# Patient Record
Sex: MALE | Race: WHITE
[De-identification: names, ages, dates, MRNs, and addresses within clinical notes are randomized per-mention and may not be internally consistent; named-entity substitution may affect disease eponyms.]

---

## 2019-07-06 ENCOUNTER — HOSPITAL ENCOUNTER (EMERGENCY)
Dept: HOSPITAL 25 - UCCORT | Age: 44
Discharge: HOME | End: 2019-07-06
Payer: COMMERCIAL

## 2019-07-06 VITALS — SYSTOLIC BLOOD PRESSURE: 145 MMHG | DIASTOLIC BLOOD PRESSURE: 102 MMHG

## 2019-07-06 DIAGNOSIS — I10: ICD-10-CM

## 2019-07-06 DIAGNOSIS — J98.01: ICD-10-CM

## 2019-07-06 DIAGNOSIS — J01.90: Primary | ICD-10-CM

## 2019-07-06 PROCEDURE — 99202 OFFICE O/P NEW SF 15 MIN: CPT

## 2019-07-06 PROCEDURE — G0463 HOSPITAL OUTPT CLINIC VISIT: HCPCS

## 2019-07-06 NOTE — UC
Respiratory Complaint HPI





- HPI Summary


HPI Summary: 





45 yo male with about a month hx of severe seasonal allergic rhinitis


now with increased facial pressure and pain x 3-4 days and a cough productive 

of green phlegm





no fever/chills


no cp or sob











- History of Current Complaint


Chief Complaint: UCGeneralIllness


Stated Complaint: SINUS/CHEST CONGESTION


Time Seen by Provider: 07/06/19 07:55


Hx Obtained From: Patient


Onset/Duration: Gradual Onset, Lasting Weeks


Timing: Constant


Severity Initially: Mild


Severity Currently: Moderate


Pain Intensity: 0


Pain Scale Used: 0-10 Numeric


Character: Cough: Productive, Sputum Description: - green


Aggravating Factors: Allergens, Exertion, Deep Breaths


Alleviating Factors: Nothing


Associated Signs And Symptoms: Positive: URI, Nasal Congestion, Sinus Discomfort





- Allergies/Home Medications


Allergies/Adverse Reactions: 


 Allergies











Allergy/AdvReac Type Severity Reaction Status Date / Time


 


contrast dye Allergy  Hives Uncoded 07/06/19 07:52











Home Medications: 


 Home Medications





Hydrochlorothiazide TAB* [Hydrodiuril TAB*] 25 mg PO DAILY 07/06/19 [History 

Confirmed 07/06/19]


Metoprolol Succinate 100 mg PO BID 07/06/19 [History Confirmed 07/06/19]











PMH/Surg Hx/FS Hx/Imm Hx


Previously Healthy: Yes


Cardiovascular History: Hypertension





- Surgical History


Surgical History: None





- Family History


Known Family History: Positive: Hypertension





- Social History


Alcohol Use: Occasionally


Substance Use Type: None


Smoking Status (MU): Never Smoked Tobacco





Review of Systems


All Other Systems Reviewed And Are Negative: Yes


Constitutional: Positive: Fatigue


Skin: Positive: Negative


Eyes: Positive: Negative


ENT: Positive: Nasal Discharge, Sinus Congestion, Sinus Pain/Tenderness


Respiratory: Positive: Cough


Cardiovascular: Positive: Negative


Gastrointestinal: Positive: Negative


Genitourinary: Positive: Negative


Motor: Positive: Negative


Neurovascular: Positive: Negative


Musculoskeletal: Positive: Negative


Neurological: Positive: Negative


Psychological: Positive: Negative





Physical Exam


Triage Information Reviewed: Yes


Appearance: Well-Appearing, No Pain Distress


Vital Signs: 


 Initial Vital Signs











Temp  98.2 F   07/06/19 07:47


 


Pulse  77   07/06/19 07:47


 


Resp  16   07/06/19 07:47


 


BP  145/102   07/06/19 07:47


 


Pulse Ox  97   07/06/19 07:47











Vital Signs Reviewed: Yes


Eyes: Positive: Conjunctiva Clear


ENT: Positive: Hearing grossly normal, Nasal congestion, TMs normal, Sinus 

tenderness, Uvula midline.  Negative: Tonsillar swelling, Tonsillar exudate, 

Muffled voice, Hoarse voice


Dental Exam: Normal


Neck: Positive: Supple, Nontender, No Lymphadenopathy


Respiratory: Positive: Lungs clear, Normal breath sounds, No respiratory 

distress, No accessory muscle use


Cardiovascular: Positive: RRR, No Murmur


Abdomen Description: Positive: Nontender


Bowel Sounds: Positive: Present


Musculoskeletal: Positive: ROM Intact, No Edema


Neurological: Positive: Alert


Psychological Exam: Normal


Skin Exam: Normal





Respiratory Course/Dx





- Differential Dx/Diagnosis


Provider Diagnosis: 


 Acute sinusitis, Acute bronchitis








Discharge





- Sign-Out/Discharge


Documenting (check all that apply): Patient Departure


All imaging exams completed and their final reports reviewed: No Studies





- Discharge Plan


Condition: Stable


Disposition: HOME


Prescriptions: 


Amoxicillin PO (*) [Amoxicillin 875 MG (*)] 875 mg PO BID #14 tab


Fluticasone NASAL SPRAY 50MCG* [Flonase NASAL SPRAY 50MCG*] 2 spray BOTH NARES 

BID #1 btl


Patient Education Materials:  Sinusitis (ED), Acute Bronchitis (ED)


Referrals: 


Jorje Mueller MD [Primary Care Provider] - 5 Days


Additional Instructions: 


warm compresses





saline nasal spray -  2 sprays each nostril twice daily 


about 5 minutes later use the flonase











- Billing Disposition and Condition


Condition: STABLE


Disposition: Home